# Patient Record
Sex: MALE | URBAN - METROPOLITAN AREA
[De-identification: names, ages, dates, MRNs, and addresses within clinical notes are randomized per-mention and may not be internally consistent; named-entity substitution may affect disease eponyms.]

---

## 2024-11-13 ENCOUNTER — APPOINTMENT (OUTPATIENT)
Dept: PRIMARY CARE | Facility: CLINIC | Age: 34
End: 2024-11-13
Payer: COMMERCIAL

## 2024-11-13 VITALS
DIASTOLIC BLOOD PRESSURE: 72 MMHG | HEART RATE: 80 BPM | RESPIRATION RATE: 14 BRPM | BODY MASS INDEX: 23.85 KG/M2 | SYSTOLIC BLOOD PRESSURE: 130 MMHG | WEIGHT: 161 LBS | HEIGHT: 69 IN | OXYGEN SATURATION: 98 %

## 2024-11-13 DIAGNOSIS — K92.1 HEMATOCHEZIA: Primary | ICD-10-CM

## 2024-11-13 DIAGNOSIS — Z00.00 PREVENTATIVE HEALTH CARE: ICD-10-CM

## 2024-11-13 PROCEDURE — 3008F BODY MASS INDEX DOCD: CPT | Performed by: INTERNAL MEDICINE

## 2024-11-13 PROCEDURE — 99203 OFFICE O/P NEW LOW 30 MIN: CPT | Performed by: INTERNAL MEDICINE

## 2024-11-13 ASSESSMENT — ENCOUNTER SYMPTOMS
DIAPHORESIS: 0
CONSTIPATION: 0
FEVER: 0
DIARRHEA: 0
JOINT SWELLING: 0
CHILLS: 0
COUGH: 0
NAUSEA: 0
ANAL BLEEDING: 1
MYALGIAS: 0
SHORTNESS OF BREATH: 0
VOMITING: 0

## 2024-11-13 NOTE — PROGRESS NOTES
"Subjective   Fahad Shields is a 34 y.o. male who presents for  NP REESTABLISH LAST SEEN 5-6 YRS AGO   DEFERS FLU SHOT   NOTICING BLOOD AFTER BM THINKS IT WAS SOMETHING HE WAS EATING A MONTH AGO ALONG WITH ISOLIC  PROTEIN HAS STOPPED PROTIEN RESOLVED AFTER A WEEK     FANILY HX OF PROSTATE CANCER FATHER ,MGF FATHER BROTHER   HPI   MAYBE A MONTH AGO, LASTED FOR 3 DAYS MAX.  WAS GENERALLY LIKE A DIME SIZE AMOUNT OF BLOOD ON THE TISSUE PAPER.      WAS A LITTLE CONSTIPATED AT THE TIME FROM TAKING PROTEIN POWDER.  STOPPED THAT AND IN THE MEANTIME, NO MORE ISSUES.      FAMILY H/O CHRON'S DISEASE, WANT CONSIDERATION FOR CHECKED OUT.    GET FLU SHOT AT PHARMACY    ATHLETE WORK OUT EVERY DAY    NO RECENT EYE EXAM     NOW WITH   Review of Systems   Constitutional:  Negative for chills, diaphoresis and fever.   Respiratory:  Negative for cough and shortness of breath.    Cardiovascular:  Negative for chest pain and leg swelling.   Gastrointestinal:  Positive for anal bleeding. Negative for constipation, diarrhea, nausea and vomiting.   Musculoskeletal:  Negative for joint swelling and myalgias.       Objective   /72   Pulse 80   Resp 14   Ht 1.753 m (5' 9\")   Wt 73 kg (161 lb)   SpO2 98%   BMI 23.78 kg/m²     Physical Exam  Vitals reviewed.   Constitutional:       General: He is not in acute distress.     Appearance: He is not ill-appearing.      Comments: ATHLETIC APPEARING MUSCULAR MALE   Cardiovascular:      Rate and Rhythm: Normal rate and regular rhythm.      Pulses: Normal pulses.      Heart sounds:      No gallop.   Pulmonary:      Breath sounds: Normal breath sounds. No wheezing, rhonchi or rales.   Abdominal:      General: Abdomen is flat. Bowel sounds are normal.      Palpations: Abdomen is soft.      Tenderness: There is no guarding or rebound.   Genitourinary:     Comments: EXTERNAL RECTAL REVEALS SMALL ON-THROMBOSED EXTERNAL HEMORRHOID AT 5 O'CLOCK POSITION CURRENTLY APPEARS " INVOLUTED  Musculoskeletal:      Right lower leg: No edema.      Left lower leg: No edema.   Skin:     General: Skin is warm and dry.   Neurological:      General: No focal deficit present.      Mental Status: He is oriented to person, place, and time.         Assessment/Plan   Problem List Items Addressed This Visit    None  Visit Diagnoses       Hematochezia    -  Primary    Relevant Orders    CBC    Comprehensive Metabolic Panel    Sedimentation Rate    C-Reactive Protein    Preventative health care        Relevant Orders    Lipid Panel          Patient Instructions    EYE EXAM IS RECOMMENDED TO CHECK FOR GLAUCOMA AND REFRACTION    2.  FASTING LABS TO CHECK CHOLESTEROL AS WELL AS LABS FOR ACTIVITY OF ANY POTENTIAL CHRON'S.  IF THE LABS ARE IN ANY WAY SUGGESTIVE OF INFLAMMATORY ISSUES, I';LL REFER YOU TO GI TO CONSIDER GETTING COLON LOOKED.    3.  TRY TO AVOID CONSTIPATION AND ANY MANEUVERS THAT INCREASE INTRA-ABDONMINAL PRESSURE ESPECIALLY WHEN WEIGHTLIFTING.  THIS CAN HELP AVOID EXACERBATION OF HEMORRHOID IN THE FUTURE.    4.  OTC CREAMS CAN HELP BUT IF YOU GET A HEMORRHOIDAL IRRITATION THAT WON'T GO AWAY WITH OVER THE COUNTER, PLEASE CALL AND I CAN ORDER A PHARMACEUTICAL CREAM FOR YOU    5.  FLU SHOT AT PHARMACY, AND YOU SHOULD BE CAUGHT UP ON TETANUS/PERTUSSIS FROM HAVING A CHILD RECENTLY    6.  CONSIDER GUARDACIL IMMUNIZATIONS TO HELP PREVENT HPV INFECTION - NOW OK FOR PATIENTS UP TO 44 YO.    7.  FOLLOW UP PHYSICAL EVERY FEW YEARS OR AS NEEDED.

## 2024-11-13 NOTE — PATIENT INSTRUCTIONS
EYE EXAM IS RECOMMENDED TO CHECK FOR GLAUCOMA AND REFRACTION    2.  FASTING LABS TO CHECK CHOLESTEROL AS WELL AS LABS FOR ACTIVITY OF ANY POTENTIAL CHRON'S.  IF THE LABS ARE IN ANY WAY SUGGESTIVE OF INFLAMMATORY ISSUES, I';LL REFER YOU TO GI TO CONSIDER GETTING COLON LOOKED.    3.  TRY TO AVOID CONSTIPATION AND ANY MANEUVERS THAT INCREASE INTRA-ABDONMINAL PRESSURE ESPECIALLY WHEN WEIGHTLIFTING.  THIS CAN HELP AVOID EXACERBATION OF HEMORRHOID IN THE FUTURE.    4.  OTC CREAMS CAN HELP BUT IF YOU GET A HEMORRHOIDAL IRRITATION THAT WON'T GO AWAY WITH OVER THE COUNTER, PLEASE CALL AND I CAN ORDER A PHARMACEUTICAL CREAM FOR YOU    5.  FLU SHOT AT PHARMACY, AND YOU SHOULD BE CAUGHT UP ON TETANUS/PERTUSSIS FROM HAVING A CHILD RECENTLY    6.  CONSIDER GUARDACIL IMMUNIZATIONS TO HELP PREVENT HPV INFECTION - NOW OK FOR PATIENTS UP TO 44 YO.    7.  FOLLOW UP PHYSICAL EVERY FEW YEARS OR AS NEEDED.